# Patient Record
Sex: MALE | Race: WHITE | NOT HISPANIC OR LATINO | URBAN - METROPOLITAN AREA
[De-identification: names, ages, dates, MRNs, and addresses within clinical notes are randomized per-mention and may not be internally consistent; named-entity substitution may affect disease eponyms.]

---

## 2017-03-03 ENCOUNTER — EMERGENCY (EMERGENCY)
Facility: HOSPITAL | Age: 62
LOS: 1 days | Discharge: HOME | End: 2017-03-03
Attending: EMERGENCY MEDICINE | Admitting: EMERGENCY MEDICINE
Payer: COMMERCIAL

## 2017-03-03 VITALS
OXYGEN SATURATION: 100 % | SYSTOLIC BLOOD PRESSURE: 154 MMHG | TEMPERATURE: 98 F | DIASTOLIC BLOOD PRESSURE: 99 MMHG | RESPIRATION RATE: 19 BRPM | HEART RATE: 63 BPM

## 2017-03-03 VITALS
DIASTOLIC BLOOD PRESSURE: 107 MMHG | OXYGEN SATURATION: 99 % | RESPIRATION RATE: 20 BRPM | SYSTOLIC BLOOD PRESSURE: 152 MMHG | HEART RATE: 95 BPM | TEMPERATURE: 98 F

## 2017-03-03 LAB
ALBUMIN SERPL ELPH-MCNC: 4.5 G/DL — SIGNIFICANT CHANGE UP (ref 3.3–5)
ALP SERPL-CCNC: 69 U/L — SIGNIFICANT CHANGE UP (ref 40–120)
ALT FLD-CCNC: 13 U/L — SIGNIFICANT CHANGE UP (ref 4–41)
APPEARANCE UR: CLEAR — SIGNIFICANT CHANGE UP
AST SERPL-CCNC: 18 U/L — SIGNIFICANT CHANGE UP (ref 4–40)
BASOPHILS # BLD AUTO: 0.07 K/UL — SIGNIFICANT CHANGE UP (ref 0–0.2)
BASOPHILS NFR BLD AUTO: 0.9 % — SIGNIFICANT CHANGE UP (ref 0–2)
BILIRUB SERPL-MCNC: 0.4 MG/DL — SIGNIFICANT CHANGE UP (ref 0.2–1.2)
BILIRUB UR-MCNC: NEGATIVE — SIGNIFICANT CHANGE UP
BLOOD UR QL VISUAL: NEGATIVE — SIGNIFICANT CHANGE UP
BUN SERPL-MCNC: 17 MG/DL — SIGNIFICANT CHANGE UP (ref 7–23)
CALCIUM SERPL-MCNC: 9.7 MG/DL — SIGNIFICANT CHANGE UP (ref 8.4–10.5)
CHLORIDE SERPL-SCNC: 103 MMOL/L — SIGNIFICANT CHANGE UP (ref 98–107)
CO2 SERPL-SCNC: 26 MMOL/L — SIGNIFICANT CHANGE UP (ref 22–31)
COLOR SPEC: YELLOW — SIGNIFICANT CHANGE UP
CREAT SERPL-MCNC: 1.51 MG/DL — HIGH (ref 0.5–1.3)
EOSINOPHIL # BLD AUTO: 0.18 K/UL — SIGNIFICANT CHANGE UP (ref 0–0.5)
EOSINOPHIL NFR BLD AUTO: 2.3 % — SIGNIFICANT CHANGE UP (ref 0–6)
GLUCOSE SERPL-MCNC: 99 MG/DL — SIGNIFICANT CHANGE UP (ref 70–99)
GLUCOSE UR-MCNC: NEGATIVE — SIGNIFICANT CHANGE UP
HCT VFR BLD CALC: 47.3 % — SIGNIFICANT CHANGE UP (ref 39–50)
HGB BLD-MCNC: 16.5 G/DL — SIGNIFICANT CHANGE UP (ref 13–17)
IMM GRANULOCYTES NFR BLD AUTO: 0.3 % — SIGNIFICANT CHANGE UP (ref 0–1.5)
KETONES UR-MCNC: NEGATIVE — SIGNIFICANT CHANGE UP
LEUKOCYTE ESTERASE UR-ACNC: NEGATIVE — SIGNIFICANT CHANGE UP
LIDOCAIN IGE QN: 47.1 U/L — SIGNIFICANT CHANGE UP (ref 7–60)
LYMPHOCYTES # BLD AUTO: 2.18 K/UL — SIGNIFICANT CHANGE UP (ref 1–3.3)
LYMPHOCYTES # BLD AUTO: 27.4 % — SIGNIFICANT CHANGE UP (ref 13–44)
MCHC RBC-ENTMCNC: 34.3 PG — HIGH (ref 27–34)
MCHC RBC-ENTMCNC: 34.9 % — SIGNIFICANT CHANGE UP (ref 32–36)
MCV RBC AUTO: 98.3 FL — SIGNIFICANT CHANGE UP (ref 80–100)
MONOCYTES # BLD AUTO: 0.62 K/UL — SIGNIFICANT CHANGE UP (ref 0–0.9)
MONOCYTES NFR BLD AUTO: 7.8 % — SIGNIFICANT CHANGE UP (ref 2–14)
MUCOUS THREADS # UR AUTO: SIGNIFICANT CHANGE UP
NEUTROPHILS # BLD AUTO: 4.9 K/UL — SIGNIFICANT CHANGE UP (ref 1.8–7.4)
NEUTROPHILS NFR BLD AUTO: 61.3 % — SIGNIFICANT CHANGE UP (ref 43–77)
NITRITE UR-MCNC: NEGATIVE — SIGNIFICANT CHANGE UP
PH UR: 6 — SIGNIFICANT CHANGE UP (ref 4.6–8)
PLATELET # BLD AUTO: 266 K/UL — SIGNIFICANT CHANGE UP (ref 150–400)
PMV BLD: 9.9 FL — SIGNIFICANT CHANGE UP (ref 7–13)
POTASSIUM SERPL-MCNC: 4.4 MMOL/L — SIGNIFICANT CHANGE UP (ref 3.5–5.3)
POTASSIUM SERPL-SCNC: 4.4 MMOL/L — SIGNIFICANT CHANGE UP (ref 3.5–5.3)
PROT SERPL-MCNC: 7.5 G/DL — SIGNIFICANT CHANGE UP (ref 6–8.3)
PROT UR-MCNC: NEGATIVE — SIGNIFICANT CHANGE UP
RBC # BLD: 4.81 M/UL — SIGNIFICANT CHANGE UP (ref 4.2–5.8)
RBC # FLD: 12.8 % — SIGNIFICANT CHANGE UP (ref 10.3–14.5)
RBC CASTS # UR COMP ASSIST: SIGNIFICANT CHANGE UP (ref 0–?)
SODIUM SERPL-SCNC: 143 MMOL/L — SIGNIFICANT CHANGE UP (ref 135–145)
SP GR SPEC: 1.01 — SIGNIFICANT CHANGE UP (ref 1–1.03)
UROBILINOGEN FLD QL: NORMAL E.U. — SIGNIFICANT CHANGE UP (ref 0.1–0.2)
WBC # BLD: 7.97 K/UL — SIGNIFICANT CHANGE UP (ref 3.8–10.5)
WBC # FLD AUTO: 7.97 K/UL — SIGNIFICANT CHANGE UP (ref 3.8–10.5)
WBC UR QL: SIGNIFICANT CHANGE UP (ref 0–?)

## 2017-03-03 PROCEDURE — 99284 EMERGENCY DEPT VISIT MOD MDM: CPT

## 2017-03-03 PROCEDURE — 74176 CT ABD & PELVIS W/O CONTRAST: CPT | Mod: 26

## 2017-03-03 PROCEDURE — 76705 ECHO EXAM OF ABDOMEN: CPT | Mod: 26

## 2017-03-03 RX ORDER — IBUPROFEN 200 MG
600 TABLET ORAL ONCE
Qty: 0 | Refills: 0 | Status: COMPLETED | OUTPATIENT
Start: 2017-03-03 | End: 2017-03-03

## 2017-03-03 RX ADMIN — Medication 600 MILLIGRAM(S): at 19:32

## 2017-03-03 RX ADMIN — Medication 600 MILLIGRAM(S): at 18:32

## 2017-03-03 NOTE — ED PROVIDER NOTE - MEDICAL DECISION MAKING DETAILS
62M in with RUQ abdominal pain, + murphys sign concern for possible choelcystitis, cbc, cmp, lipase, US,

## 2017-03-03 NOTE — ED PROVIDER NOTE - PHYSICAL EXAMINATION
+ murphys sign + murphys sign  Adilene att: nad, aaox3, ctabl, rrr, s1s2, abd soft rt flank tender neg guard neg rebound, neg cvat, neg le edema

## 2017-03-03 NOTE — ED PROVIDER NOTE - OBJECTIVE STATEMENT
62 year old male with history of hypothyroidism, in with 2 days of RUQ abdominal pain, described as 7/10 in intensity, constant but waxes and wanes, certain positions make the pain better or worse.  Not associated with food intake.      ROS negative for nausea, vomiting, diarrhea, chest pain, shortness of breath, fever, constipation,   ROS positive for RUQ abdominal pain. 62 year old male with history of hypothyroidism, in with 2 days of RUQ abdominal pain, described as 7/10 in intensity, constant but waxes and wanes, certain positions make the pain better or worse.  Not associated with food intake.      ROS negative for nausea, vomiting, diarrhea, chest pain, shortness of breath, fever, constipation,  ROS positive for RUQ abdominal pain.     Adilene att: 62M hx hypothyroid, appendectomy, rt urolith c/o ruq pain x 2 days. Yesterday patient noted rt mid back, rt flank pain, severe, 10/10, x hours. Today pain migrated into ruq, intermitt. Denies fever, nausea, vomiting, diarrhea, dysuria, urinary frequency, urgency, hematuria.

## 2017-03-03 NOTE — ED ADULT NURSE REASSESSMENT NOTE - NS ED NURSE REASSESS COMMENT FT1
received pt from ANTONIETTA Bernstein. Went to US. Not seen yet. Will update pt's VS whenever he gets ED. Will continue to monitor.

## 2017-03-03 NOTE — ED PROVIDER NOTE - ATTENDING CONTRIBUTION TO CARE
Dr. Head: I have personally seen and examined this patient at the bedside. I have fully participated in the care of this patient. I have reviewed all pertinent clinical information, including history, physical exam, plan and the Resident's note and agree except as noted. HPI above as by me. PE above as by me. DDX urolith, geoff, age PLAN labs urine sono

## 2017-03-03 NOTE — ED ADULT NURSE REASSESSMENT NOTE - NS ED NURSE REASSESS COMMENT FT1
pt AO x3, came back from US. Pending result. VSS at her baseline. Denies severe/ moderate pain by now. Will continue to monitor.

## 2017-03-03 NOTE — ED ADULT TRIAGE NOTE - CHIEF COMPLAINT QUOTE
Pt with c/o RUQ pain since yesterday that radiated into back. Pt denies n/v is afebrile in triage. Pt sent in for ct scan

## 2021-03-10 PROBLEM — E03.9 HYPOTHYROIDISM, UNSPECIFIED: Chronic | Status: ACTIVE | Noted: 2017-03-03

## 2021-04-27 ENCOUNTER — NON-APPOINTMENT (OUTPATIENT)
Age: 66
End: 2021-04-27

## 2021-04-27 ENCOUNTER — APPOINTMENT (OUTPATIENT)
Dept: OPHTHALMOLOGY | Facility: CLINIC | Age: 66
End: 2021-04-27
Payer: MEDICARE

## 2021-04-27 PROCEDURE — 92004 COMPRE OPH EXAM NEW PT 1/>: CPT

## 2022-03-25 ENCOUNTER — EMERGENCY (EMERGENCY)
Facility: HOSPITAL | Age: 67
LOS: 1 days | Discharge: HOME | End: 2022-03-25
Attending: EMERGENCY MEDICINE | Admitting: EMERGENCY MEDICINE
Payer: MEDICARE

## 2022-03-25 ENCOUNTER — TRANSCRIPTION ENCOUNTER (OUTPATIENT)
Age: 67
End: 2022-03-25

## 2022-03-25 VITALS
TEMPERATURE: 99 F | SYSTOLIC BLOOD PRESSURE: 147 MMHG | RESPIRATION RATE: 16 BRPM | DIASTOLIC BLOOD PRESSURE: 111 MMHG | HEART RATE: 62 BPM | OXYGEN SATURATION: 100 %

## 2022-03-25 VITALS
DIASTOLIC BLOOD PRESSURE: 100 MMHG | SYSTOLIC BLOOD PRESSURE: 168 MMHG | TEMPERATURE: 98 F | OXYGEN SATURATION: 98 % | RESPIRATION RATE: 18 BRPM | HEART RATE: 62 BPM

## 2022-03-25 PROCEDURE — 99283 EMERGENCY DEPT VISIT LOW MDM: CPT

## 2022-03-25 PROCEDURE — 71046 X-RAY EXAM CHEST 2 VIEWS: CPT | Mod: 26

## 2022-03-25 RX ORDER — LIDOCAINE 4 G/100G
15 CREAM TOPICAL ONCE
Refills: 0 | Status: COMPLETED | OUTPATIENT
Start: 2022-03-25 | End: 2022-03-25

## 2022-03-25 RX ADMIN — Medication 30 MILLILITER(S): at 01:50

## 2022-03-25 RX ADMIN — LIDOCAINE 15 MILLILITER(S): 4 CREAM TOPICAL at 01:50

## 2022-03-25 NOTE — ED PROVIDER NOTE - PROGRESS NOTE DETAILS
Dr. Juan Jose Puckett DO (ED ATTENDING):  CXR with no acute abnormalities  patient feeling better and reports to feel normal self now after po meds and able to tolerate po intake. will dc with routine pmd f/u as needed.    All test results have been reviewed with the patient with verbalized understanding.  Opportunities to ask questions for further understanding have been offered to the patient.  The patient feels comfortable going home after our evaluation and understands to return to the Emergency Department for any new or worsening symptoms.

## 2022-03-25 NOTE — ED PROVIDER NOTE - PHYSICAL EXAMINATION
General: Patient alert in no apparent distress  Skin: Dry and intact  HEENT: Head atraumatic. Oral mucosa moist. normal voice. No pharyngeal exudates, redness, or tonsillar enlargement, uvula midline without signs of peritonsillar abscess   Eyes: Conjunctiva normal  Cardiac: Regular rhythm and rate. No pretibial edema b/l  Respiratory: Lungs clear b/l and symmetric. no stridor or wheezing. No respiratory distress. Able to speak in complete sentences.  Gastrointestinal: Abdomen soft, nondistended, nontender  Musculoskeletal: Moves all extremities spontaneously  Neurological: alert and oriented to person, place, and time  Psychiatric: Calm and cooperative

## 2022-03-25 NOTE — ED PROVIDER NOTE - OBJECTIVE STATEMENT
68yo M with PMHX hypothyroidism and gout presenting to ED with burning sensation in throat and concern for aspiration after vomiting while lying in bed 3hours ago.  Says that he had a dinner ~7PM of rice, cheese, yogurt, croquets, berry jam, and red wine, he went to sleep ~10PM, then ~11PM while lying flat he began coughing and vomited 1x and has sensation to clear throat afterwards. No sob, fevers, or other complaints.

## 2022-03-25 NOTE — ED PROVIDER NOTE - CLINICAL SUMMARY MEDICAL DECISION MAKING FREE TEXT BOX
68yo M with PMHX hypothyroidism and gout presenting to ED with burning sensation in throat and concern for aspiration after vomiting while lying in bed 3hours ago.  EXAM with no resp distress, lungs clear without stridor. A/P- ?small aspiration episode after eating vs gerd. patient able to swallow po water here and symptoms improving since in ED. will give po maalox and viscous lidocaine for comfort and get cxr. likely discharge home

## 2022-03-25 NOTE — ED ADULT TRIAGE NOTE - CHIEF COMPLAINT QUOTE
Pt arrives to ED via EMS c/o SOB with coughing.  Pt feels he may have aspirated during sleep because he woke up choking on his saliva.  Pt reports chronic difficulty swallowing at times due to a thyroid issue (denies cancer.)  Hx of gout.  Pt arrives on 3L Oxygen via nasal cannula from EMS for comfort which helps with the cough.

## 2022-03-25 NOTE — ED PROVIDER NOTE - NS ED ROS FT
Constitutional: No weakness or fatigue, no fevers or chills  Skin: No rash or pruritis  HEENT: + burning throat discomfort   Cardiovascular: No chest pain, no shortness of breath  Respiratory: No shortness of breath, no cough  Gastrointestinal: No abdominal pain, no nausea, no vomiting, no diarrhea, no constipation  Musculoskeletal: No joint pain  Genitourinary: No dysuria or hematuria  Neurological: No focal weakness or tingling

## 2022-03-25 NOTE — ED ADULT NURSE NOTE - OBJECTIVE STATEMENT
Pt presents to room 1, alert&orientedx4, ambulatory, PMHX Gout, hypothyroidism coming to ED for cough since 11pm this evening. Pt states he woke up and felt that his saliva and stomach acid went up, endorses one episode of vomiting. States "I feel like I cannot clear my throat". Pt speaking in full and complete sentences, breathing non-labored, no facial drooling noted. Placed on 2L of O2 NC for comfort. NSR on cardiac monitor. Pt hypertensive, denies any chest pain or feelings of throat closing, n/v/d or SOB at this time. Pending MD hassan

## 2022-03-25 NOTE — ED ADULT NURSE NOTE - NSIMPLEMENTINTERV_GEN_ALL_ED
Implemented All Universal Safety Interventions:  [place] to call system. Call bell, personal items and telephone within reach. Instruct patient to call for assistance. Room bathroom lighting operational. Non-slip footwear when patient is off stretcher. Physically safe environment: no spills, clutter or unnecessary equipment. Stretcher in lowest position, wheels locked, appropriate side rails in place.

## 2022-03-25 NOTE — ED PROVIDER NOTE - NSFOLLOWUPINSTRUCTIONS_ED_ALL_ED_FT
You were seen in the Emergency Room for throat discomfort after vomiting in bed.  You were evaluated for any life-threatening conditions.     After our evaluation, including a chest Xray, we have determined you do not have a life-threatening condition today and you can be discharged home.    Please return to the Emergency Room if your symptoms change or worsen, such as persistent vomiting, blood in the stool, difficulty breathing, or inability to swallow.    You can take over the counter maalox as needed for throat discomfort.    Please follow up with a general medicine doctor (PMD) ROUTINELY     If you need help making an appointment with a doctor or do not have your own PMD, you can call our referral line at [phone] to make an appointment with a general medicine doctor (PMD).
